# Patient Record
Sex: MALE | Race: WHITE | Employment: OTHER | URBAN - METROPOLITAN AREA
[De-identification: names, ages, dates, MRNs, and addresses within clinical notes are randomized per-mention and may not be internally consistent; named-entity substitution may affect disease eponyms.]

---

## 2017-01-01 ENCOUNTER — TELEPHONE (OUTPATIENT)
Dept: FAMILY MEDICINE CLINIC | Facility: CLINIC | Age: 82
End: 2017-01-01

## 2017-01-01 ENCOUNTER — OFFICE VISIT (OUTPATIENT)
Dept: PODIATRY CLINIC | Facility: CLINIC | Age: 82
End: 2017-01-01

## 2017-01-01 ENCOUNTER — TELEPHONE (OUTPATIENT)
Dept: OTHER | Age: 82
End: 2017-01-01

## 2017-01-01 ENCOUNTER — TELEPHONE (OUTPATIENT)
Dept: NEUROLOGY | Facility: CLINIC | Age: 82
End: 2017-01-01

## 2017-01-01 ENCOUNTER — OFFICE VISIT (OUTPATIENT)
Dept: FAMILY MEDICINE CLINIC | Facility: CLINIC | Age: 82
End: 2017-01-01

## 2017-01-01 ENCOUNTER — OFFICE VISIT (OUTPATIENT)
Dept: PULMONOLOGY | Facility: CLINIC | Age: 82
End: 2017-01-01

## 2017-01-01 VITALS
HEIGHT: 73 IN | HEART RATE: 62 BPM | TEMPERATURE: 98 F | WEIGHT: 256 LBS | DIASTOLIC BLOOD PRESSURE: 81 MMHG | RESPIRATION RATE: 16 BRPM | SYSTOLIC BLOOD PRESSURE: 146 MMHG | BODY MASS INDEX: 33.93 KG/M2

## 2017-01-01 VITALS
WEIGHT: 260 LBS | RESPIRATION RATE: 16 BRPM | DIASTOLIC BLOOD PRESSURE: 70 MMHG | OXYGEN SATURATION: 96 % | HEART RATE: 69 BPM | BODY MASS INDEX: 34.46 KG/M2 | SYSTOLIC BLOOD PRESSURE: 135 MMHG | HEIGHT: 73 IN

## 2017-01-01 DIAGNOSIS — M79.675 PAIN OF TOE OF LEFT FOOT: Primary | ICD-10-CM

## 2017-01-01 DIAGNOSIS — M79.673 PAIN OF FOOT, UNSPECIFIED LATERALITY: Primary | ICD-10-CM

## 2017-01-01 DIAGNOSIS — M79.674 PAIN OF TOE OF RIGHT FOOT: ICD-10-CM

## 2017-01-01 DIAGNOSIS — R41.3 MEMORY LOSS: ICD-10-CM

## 2017-01-01 DIAGNOSIS — B35.1 ONYCHOMYCOSIS: Primary | ICD-10-CM

## 2017-01-01 DIAGNOSIS — R41.3 MEMORY DIFFICULTIES: Primary | ICD-10-CM

## 2017-01-01 DIAGNOSIS — I26.99 OTHER PULMONARY EMBOLISM WITHOUT ACUTE COR PULMONALE, UNSPECIFIED CHRONICITY (HCC): Primary | ICD-10-CM

## 2017-01-01 DIAGNOSIS — B35.1 DERMATOPHYTOSIS OF NAIL: ICD-10-CM

## 2017-01-01 DIAGNOSIS — L30.9 DERMATITIS: ICD-10-CM

## 2017-01-01 DIAGNOSIS — M79.673 PAIN OF FOOT, UNSPECIFIED LATERALITY: ICD-10-CM

## 2017-01-01 PROCEDURE — 99212 OFFICE O/P EST SF 10 MIN: CPT | Performed by: INTERNAL MEDICINE

## 2017-01-01 PROCEDURE — 11721 DEBRIDE NAIL 6 OR MORE: CPT | Performed by: PODIATRIST

## 2017-01-01 PROCEDURE — 99213 OFFICE O/P EST LOW 20 MIN: CPT | Performed by: INTERNAL MEDICINE

## 2017-01-01 PROCEDURE — 99213 OFFICE O/P EST LOW 20 MIN: CPT | Performed by: FAMILY MEDICINE

## 2017-01-01 PROCEDURE — 99212 OFFICE O/P EST SF 10 MIN: CPT | Performed by: FAMILY MEDICINE

## 2017-01-01 RX ORDER — QUETIAPINE 25 MG/1
TABLET, FILM COATED ORAL
Qty: 270 TABLET | Refills: 0 | Status: SHIPPED | OUTPATIENT
Start: 2017-01-01

## 2017-01-01 RX ORDER — LISINOPRIL AND HYDROCHLOROTHIAZIDE 12.5; 1 MG/1; MG/1
TABLET ORAL
Qty: 90 TABLET | Refills: 1 | Status: SHIPPED | OUTPATIENT
Start: 2017-01-01 | End: 2018-01-01

## 2017-01-01 RX ORDER — LISINOPRIL AND HYDROCHLOROTHIAZIDE 12.5; 1 MG/1; MG/1
TABLET ORAL
Qty: 90 TABLET | Refills: 0 | Status: SHIPPED | OUTPATIENT
Start: 2017-01-01 | End: 2017-01-01

## 2017-01-01 RX ORDER — GLIMEPIRIDE 1 MG/1
TABLET ORAL
Qty: 90 TABLET | Refills: 3 | Status: SHIPPED | OUTPATIENT
Start: 2017-01-01 | End: 2018-01-01

## 2017-01-01 RX ORDER — OMEPRAZOLE 20 MG/1
20 CAPSULE, DELAYED RELEASE ORAL EVERY MORNING
Qty: 180 CAPSULE | Refills: 3 | Status: SHIPPED | OUTPATIENT
Start: 2017-01-01

## 2017-01-01 RX ORDER — MOMETASONE FUROATE 1 MG/G
OINTMENT TOPICAL
Qty: 45 G | Refills: 2 | Status: SHIPPED | OUTPATIENT
Start: 2017-01-01

## 2017-01-01 RX ORDER — QUETIAPINE 25 MG/1
25 TABLET, FILM COATED ORAL NIGHTLY
Qty: 90 TABLET | Refills: 0 | Status: SHIPPED | OUTPATIENT
Start: 2017-01-01 | End: 2017-01-01

## 2017-02-03 ENCOUNTER — OFFICE VISIT (OUTPATIENT)
Dept: OTOLARYNGOLOGY | Facility: CLINIC | Age: 82
End: 2017-02-03

## 2017-02-03 ENCOUNTER — OFFICE VISIT (OUTPATIENT)
Dept: AUDIOLOGY | Facility: CLINIC | Age: 82
End: 2017-02-03

## 2017-02-03 VITALS — SYSTOLIC BLOOD PRESSURE: 135 MMHG | DIASTOLIC BLOOD PRESSURE: 68 MMHG | HEART RATE: 70 BPM

## 2017-02-03 DIAGNOSIS — H90.3 SENSORINEURAL HEARING LOSS, BILATERAL: Primary | ICD-10-CM

## 2017-02-03 DIAGNOSIS — H91.90 HEARING LOSS, UNSPECIFIED HEARING LOSS TYPE, UNSPECIFIED LATERALITY: Primary | ICD-10-CM

## 2017-02-03 PROCEDURE — 92567 TYMPANOMETRY: CPT | Performed by: AUDIOLOGIST

## 2017-02-03 PROCEDURE — G0463 HOSPITAL OUTPT CLINIC VISIT: HCPCS | Performed by: OTOLARYNGOLOGY

## 2017-02-03 PROCEDURE — 92557 COMPREHENSIVE HEARING TEST: CPT | Performed by: AUDIOLOGIST

## 2017-02-03 PROCEDURE — 99203 OFFICE O/P NEW LOW 30 MIN: CPT | Performed by: OTOLARYNGOLOGY

## 2017-02-03 NOTE — PROGRESS NOTES
Mell Huffman is a 80year old male. Patient presents with:  Hearing Loss        HISTORY OF PRESENT ILLNESS  2/3/2017   Here for evaluation of   hearing loss.  Patient feels this has worsened over the las years and  is not  associated with tinnitus ot intolerance. Neuro Negative Tremors. Psych Negative Behavioral issues   Integumentary Negative Frequent skin infections, pigment change and rash. Hema/Lymph Negative Easy bleeding and easy bruising.      PHYSICAL EXAM    /68 mmHg  Pulse 70    Sy Cyanocobalamin (B-12) 2000 MCG Oral Tab, Take 1 tablet by mouth daily. , Disp: , Rfl:   •  aspirin 325 MG Oral Tab, Take 325 mg by mouth daily. , Disp: , Rfl:   •  acetaminophen (TYLENOL EXTRA STRENGTH) 500 MG Oral Tab, Take 500 mg by mouth every 6 (six) ho

## 2017-02-03 NOTE — PROGRESS NOTES
AUDIOLOGY REPORT      Hyacinth Ross is a 80year old male     Referring Provider: No ref. provider found   YOB: 1930  Medical Record: [id number]      Patient Hearing History:  Patient is here with wife.    He reported some balance/unste amplification with hearing aids would be preferable to address more situations than just TV-watching. Recommendations: Follow up with Dr. Latisha Velarde. Consider hearing aid evaluation to further explore amplification options, styles and costs.        [employer]

## 2017-02-24 NOTE — PROGRESS NOTES
HPI:    Patient ID: Madelyn Doyle is a 80year old male. HPI  This pleasant 51-year-old male presents for care associated with his chronic painful fungus toenails. He reports relief by previous care.   Review of Systems  I did review present medic some keratosis. Upon debridement there is no ulceration or infection.   I anticipate relief will see patient for care if and when symptoms redevelop         ASSESSMENT/PLAN:   Pain of toe of left foot  (primary encounter diagnosis)  Pain of toe of right fo

## 2017-04-13 NOTE — PROGRESS NOTES
HPI:    Patient ID: Sukhjinder De La Rosa is a 80year old male. HPI Comments: Pt presents with an itchy rash on the trunk for the last week. No new topical agents or ingestions. Pt has tried otc remedies without consistent relief.  No fevers or acute illn STRENGTH) 500 MG Oral Tab Take 500 mg by mouth every 6 (six) hours as needed for Pain. Disp:  Rfl:      Allergies:No Known Allergies   PHYSICAL EXAM:   Physical Exam   Constitutional: He appears well-developed and well-nourished.    Cardiovascular: Normal r

## 2017-04-28 NOTE — PROGRESS NOTES
HPI:    Patient ID: Matthew Cannon is a 80year old male. HPI  This 72-year-old male presents with recurrent pain associated with his fungus toenails. Patient reports relief by previous care.   Review of Systems  I did review present medical status accomplished today without incident. I reduced nail, subungual debris, and some keratosis. Upon debridement there is no ulceration or infection open or draining was noted.   I anticipate relief and will reevaluate for care if and when symptoms redevelop

## 2017-05-06 NOTE — TELEPHONE ENCOUNTER
Message noted: Chart reviewed and may refill medication as requested times one with 3additional refills. Prescription sent to listed pharmacy. Pharmacy to notify patient.

## 2017-05-07 NOTE — TELEPHONE ENCOUNTER
Neuropsychology testing from Dr. Wade Fatima 2/22/17 reviewed: In summary, primary production, moderate in magnitude, and aspects of executive functioning and anterograde memory functions (at levels of retrieval/recognition).   Additionally, divided and se

## 2017-06-26 NOTE — PROGRESS NOTES
The patient is an 40-year-old male who I know well from prior evaluation of comes in now for follow-up. In general, he is doing well. He remains on aspirin for history of venous thromboembolism.     Review of systems: Vision normal. Ear nose and throat no

## 2017-06-30 NOTE — TELEPHONE ENCOUNTER
Pt has new h[employer] insurance needs 2 referrals one for neurology dr Mena samuel and another one for dr sanchez podiatry

## 2017-07-01 NOTE — TELEPHONE ENCOUNTER
Referral requests noted. Referral approved, generated and sent to [facility].

## 2017-08-21 NOTE — TELEPHONE ENCOUNTER
Please make sure that patient follows up with PCP or Caty Portillo PA-C regarding vitamin D, omeprazole, and magnesium levels

## 2017-08-21 NOTE — TELEPHONE ENCOUNTER
Pending Prescriptions Disp Refills    OMEPRAZOLE 20 MG Oral Capsule Delayed Release [Pharmacy Med Name: OMEPRAZOLE 20MG CAPSULES] 180 capsule 0     Sig: TAKE 1 CAPSULE(20 MG) BY MOUTH TWICE DAILY BEFORE MEALS         See refill request  Patient last seen

## 2017-08-22 NOTE — TELEPHONE ENCOUNTER
omeprazole 20 MG Oral Capsule Delayed Release was prescribe by Dr. Maylin Joaquin. Wife asking if pt can take omeprazole with vitamin D ? Please advise.

## 2017-08-22 NOTE — TELEPHONE ENCOUNTER
I spoke to Shashank's wife. She states the pt was unable to come to the phone. I advised pt make an appt with either Milton Elena PA-C or his PCP.  Pt's wife verbalizes understanding but states it is difficult to get him out of the house

## 2017-08-23 NOTE — TELEPHONE ENCOUNTER
[facility] they received an order but needs pt's demographics with insurance info and LOV progress note.        Fax #  [phone]

## 2017-08-25 NOTE — TELEPHONE ENCOUNTER
Spoke to patient's wife (HIPPA verified) and relayed NP message in regards to okay to take Omeprazole and Vitamin D, just to take at different times. Verbalized understanding. No further questions or concerns at this time.

## 2017-08-31 NOTE — TELEPHONE ENCOUNTER
[facility] requesting nurse for Dr Trae Castillo to call her back re  home health care for pt- said   pt does not meet criteria for home health    already spoke with Referral Dept & advised to speak with RN

## 2017-09-08 NOTE — TELEPHONE ENCOUNTER
Daughter calling very upset as they have been trying to get [address] initiated for her father for several weeks now.  I spoke with Breanna Starr nurse  who states care must be initiated by Dr Ronaldo Gautam with a phone call to [facility]

## 2017-09-09 NOTE — TELEPHONE ENCOUNTER
I have not seen patient in some time. Was patient hospitalized recently? Or did another physician initiate home health. Usually for home health there needs to be a recent face to face encounter to establish nursing or PT needs.  Has this been done with jamel

## 2017-09-11 NOTE — TELEPHONE ENCOUNTER
[facility] supervisor states that they would need a recent OV (within the last month) to be able to start the [facility] process. [facility] care for medicare has to have a specific \"goal\" for the pt and that would be specified by the PCP.  She also suggested transitioning

## 2017-09-11 NOTE — TELEPHONE ENCOUNTER
Spoke at length with pt's daughter Sana Whipple. [phone].   States that her father hasn't been recently hospitalized nor has he seen another MD.  Pt is getting more difficult to take care of and extremely difficult to get out of the house (to bring to the d

## 2017-09-11 NOTE — TELEPHONE ENCOUNTER
Message noted and then would have the following options:    1. to come in to discuss either with us at an office visit. 2. Can use a physician that makes home visits to make an an official visit for assessment.    3. If patient is having sig medical issues

## 2017-09-11 NOTE — TELEPHONE ENCOUNTER
Advised patient's daughter Kike Kaiser of Dr. Catarina Patten note below. Patient's daughter verbalized understanding.  Patient's daughter is waiting to hear back from [facility] who are reaching out to Neurologist to see if visit on 9/25 could qualify for the face to fa

## 2017-09-18 NOTE — TELEPHONE ENCOUNTER
Hypertensive Medications Medication refilled for 90 days per protocol.   Protocol Criteria:  · Appointment scheduled in the past 6 months or in the next 3 months  · BMP or CMP in the past 12 months  · Creatinine result < 2  Recent Outpatient Visits

## 2017-09-25 NOTE — PROGRESS NOTES
[facility] Follow[facility]    Yuko Gupta is a 80year old male. HPI:     Patient is being seen in follow-up. He is accompanied by multiple family members, including daughter, son, and wife, to the visit today.   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 Not consistent with atypical form of dementia such as Lewy body or frontotemporal dementia.   No appare Rfl:    acetaminophen (TYLENOL EXTRA STRENGTH) 500 MG Oral Tab Take 500 mg by mouth every 6 (six) hours as needed for Pain.  Disp:  Rfl:        Allergies:  No Known Allergies      ROS:   GENERAL: no weight loss or fevers  HEENT: denies nasal congestion, sin us an update, and we may have to continue to adjust to optimal dose; discussed risks/side effects including sedation    –Pending response to Seroquel, may consider adding on Namenda to Aricept    –Patient would benefit from home health services including P

## 2017-09-25 NOTE — TELEPHONE ENCOUNTER
Pts daughter calling to notify us that she would like paperwork filled out by Dr. Edward Saenz to the residential home health service for her father to have in home care because it is hard to get him out of the house.  Wanted us to know prior to appointment kathleen

## 2017-09-26 NOTE — TELEPHONE ENCOUNTER
Received call from Eben Black ( ) from [facility]. Patient needs an order to be sent to [facility].  It needs to say:  RN,P.T.,O.T.,MSW,and AID.   Please fax order to [facility]. Thank Kirk Calix

## 2017-09-27 NOTE — PROGRESS NOTES
Message and consult noted. I am not aware of anyone from our clinic or at [facility] that does home visits. Thanks for the update.

## 2017-09-27 NOTE — TELEPHONE ENCOUNTER
Forms completed and signed by Dr. Charbel Hernandez. Will wait for office note from 9/25/17 to be completed before faxing to [facility].

## 2017-09-27 NOTE — TELEPHONE ENCOUNTER
Forms received 9-22-17 in nurses bin; [facility] needs back asap.  Call with any questions

## 2017-10-20 NOTE — TELEPHONE ENCOUNTER
Spoke with [facility] with progress update:  Please see below and advise    Yuko De La Cruz is requesting verbal order for OT 1 x week x 3 weeks    Home Health Aide 2 x week x 3 weeks to try and facilitate daily hygiene routine as wife is primary caregive

## 2017-10-20 NOTE — TELEPHONE ENCOUNTER
Kaykay/[facility] physical therapist requesting callback to provide OT status  Also questioning if Medication adjustment might be required

## 2017-10-21 NOTE — TELEPHONE ENCOUNTER
Message noted and approve home health requests. Will need to follow up with his neurologist, Dr Charbel Hernandez for medication adjustment.

## 2017-10-23 NOTE — TELEPHONE ENCOUNTER
Kaykay/[facility] requesting verbal order  for speech therapy for cognitive therapy for 2 times for 2 weeks

## 2017-10-23 NOTE — TELEPHONE ENCOUNTER
S/w Elise to notify her Dr. Sean Marshall suggests to increase Seroquel to 25 mg twice daily. Lynette Crum stated she would call back when pt needs refills.

## 2017-10-23 NOTE — TELEPHONE ENCOUNTER
Susan Hoffman (pt's wife) says that she sees no difference in patient's behavior since starting Seroquel 25mg every night. Wife said therapist came to the home Friday, and patient was uncooperative. Wife is asking if a change in medication might help.      Theresa Ly

## 2017-10-23 NOTE — TELEPHONE ENCOUNTER
Referral request noted. Referral approved, generated and sent to [facility].

## 2017-10-23 NOTE — TELEPHONE ENCOUNTER
Left message to Yoan Mora RN [facility] about Dr Hayley Minaya note. Note      That is fine for speech therapy.

## 2017-10-23 NOTE — TELEPHONE ENCOUNTER
Please see message below and advise.       Note      Kaykay/[facility] requesting verbal order  for speech therapy for cognitive therapy for 2 times for 2 weeks

## 2017-10-23 NOTE — TELEPHONE ENCOUNTER
Spouse is calling for a podiatry referral for her . Please sign referral order if you agree. Thank you.

## 2017-11-01 NOTE — TELEPHONE ENCOUNTER
Wife is asking for a referral for a Podiatrist to see the patient from [address] Physician [facility] to come to his house and cut his nails. I pended the referral with Onychomycosis as the diagnostic code. Please advise.

## 2017-11-02 NOTE — TELEPHONE ENCOUNTER
Called [employer]. Patient has [employer] benefits. Can go out of network. No referral need. But patient needs to call health plan to verify providers. [phone]. Left message on VM to call me back.

## 2017-11-08 NOTE — TELEPHONE ENCOUNTER
Spoke to patient's home health nurse, Kendra Jang. Patient will be discharged soon from home health and wanted to touch base with office. His seroquel was increased to 25 mg BID which has not helped with his behavior and agitation.  Wife admits that she does not f

## 2017-11-09 NOTE — TELEPHONE ENCOUNTER
Can increase Seroquel to 25 mg in the morning and 50 mg at bedtime; if tolerating, can further increase to 50 mg twice daily after 1 week. Keep us updated on patient's progress.

## 2017-11-10 NOTE — TELEPHONE ENCOUNTER
S/w Essie Bermudez she stated she is unable to take medication directions and asked to call wife. S/w Elise to notify her pt can increase Seroquel to 25 mg in the am and 50 mg nightly x 1 week then increase to 50 mg BID if tolerating.  Tricia He agrees to plan and was th

## 2017-11-13 NOTE — TELEPHONE ENCOUNTER
Refill request for Quetiapine 25 mg take 1 tab nightly, qt:90 0 refills    LOV: 9/25/17  NOV: None  Last refill: 9/25/17 qt:30 3 refills

## 2017-11-17 NOTE — TELEPHONE ENCOUNTER
Aris Kurtz from [facility] called to inform Dr. Rodriguez Knock discharge , and is asking for physician verbal order for palliative care consult. Please advise.

## 2017-11-20 NOTE — TELEPHONE ENCOUNTER
Dione Angelucci , message left on  Voice mail that Dr. Shalom Bryant approve verbal order for Palliative Care .

## 2017-11-27 NOTE — TELEPHONE ENCOUNTER
Vadim Juarez called in requesting discharge agency orders for Pt. Vadim Juarez states that if  Has any questions to please call.

## 2017-12-01 NOTE — TELEPHONE ENCOUNTER
Received call from [facility], counselor for [facility] who reports patient has end stage Alzheimer, dementia, and Type 2 diabetes and wife is requesting an order to place patient on hospice. Order needs to be faxed to [phone].  Message routed to

## 2017-12-02 NOTE — TELEPHONE ENCOUNTER
Message noted and can initiate hospice care. Can give verbal order and will sign home health/ hospice order.

## 2017-12-16 NOTE — TELEPHONE ENCOUNTER
Hypertensive Medications: Protocol failed, please advise on prescription request.'  Protocol Criteria:  · Appointment scheduled in the past 6 months or in the next 3 months  · BMP or CMP in the past 12 months  · Creatinine result < 2  Recent Outpatient Vis

## 2017-12-16 NOTE — TELEPHONE ENCOUNTER
Dr. Buck Espino, Med did not pass protocol, please advise on refill    CSS, please assist pt/family member to schedule appt.

## 2018-01-01 ENCOUNTER — TELEPHONE (OUTPATIENT)
Dept: INTERNAL MEDICINE CLINIC | Facility: CLINIC | Age: 83
End: 2018-01-01

## 2018-01-01 ENCOUNTER — TELEPHONE (OUTPATIENT)
Dept: OTHER | Age: 83
End: 2018-01-01

## 2018-01-01 ENCOUNTER — APPOINTMENT (OUTPATIENT)
Dept: CT IMAGING | Facility: HOSPITAL | Age: 83
DRG: 082 | End: 2018-01-01
Attending: INTERNAL MEDICINE
Payer: MEDICARE

## 2018-01-01 ENCOUNTER — APPOINTMENT (OUTPATIENT)
Dept: CT IMAGING | Facility: HOSPITAL | Age: 83
DRG: 082 | End: 2018-01-01
Attending: EMERGENCY MEDICINE
Payer: MEDICARE

## 2018-01-01 ENCOUNTER — APPOINTMENT (OUTPATIENT)
Dept: GENERAL RADIOLOGY | Facility: HOSPITAL | Age: 83
DRG: 082 | End: 2018-01-01
Attending: INTERNAL MEDICINE
Payer: MEDICARE

## 2018-01-01 ENCOUNTER — HOSPITAL ENCOUNTER (INPATIENT)
Facility: HOSPITAL | Age: 83
LOS: 7 days | Discharge: NURSING, REHAB OR LONG-TERM CARE | DRG: 082 | End: 2018-01-01
Attending: EMERGENCY MEDICINE | Admitting: HOSPITALIST
Payer: MEDICARE

## 2018-01-01 VITALS
SYSTOLIC BLOOD PRESSURE: 142 MMHG | OXYGEN SATURATION: 95 % | DIASTOLIC BLOOD PRESSURE: 46 MMHG | BODY MASS INDEX: 37 KG/M2 | WEIGHT: 280.5 LBS | RESPIRATION RATE: 16 BRPM | TEMPERATURE: 98 F | HEART RATE: 94 BPM

## 2018-01-01 DIAGNOSIS — S16.1XXA STRAIN OF NECK MUSCLE, INITIAL ENCOUNTER: ICD-10-CM

## 2018-01-01 DIAGNOSIS — S06.5X9A ACUTE SUBDURAL HEMATOMA (HCC): Primary | ICD-10-CM

## 2018-01-01 LAB
ADENOVIRUS PCR:: NEGATIVE
ANION GAP SERPL CALC-SCNC: 12 MMOL/L (ref 0–18)
ANION GAP SERPL CALC-SCNC: 8 MMOL/L (ref 0–18)
ANION GAP SERPL CALC-SCNC: 8 MMOL/L (ref 0–18)
ANTIBODY SCREEN: NEGATIVE
APTT PPP: 28.7 SECONDS (ref 23.2–35.3)
B PERT DNA SPEC QL NAA+PROBE: NEGATIVE
BASOPHILS # BLD: 0 K/UL (ref 0–0.2)
BASOPHILS # BLD: 0.1 K/UL (ref 0–0.2)
BASOPHILS NFR BLD: 0 %
BASOPHILS NFR BLD: 1 %
BILIRUB UR QL: NEGATIVE
BLOOD TYPE BARCODE: 600
BLOOD TYPE BARCODE: 600
BUN SERPL-MCNC: 17 MG/DL (ref 8–20)
BUN SERPL-MCNC: 17 MG/DL (ref 8–20)
BUN SERPL-MCNC: 18 MG/DL (ref 8–20)
BUN/CREAT SERPL: 10.5 (ref 10–20)
BUN/CREAT SERPL: 13.6 (ref 10–20)
BUN/CREAT SERPL: 13.9 (ref 10–20)
C PNEUM DNA SPEC QL NAA+PROBE: NEGATIVE
CALCIUM SERPL-MCNC: 8.9 MG/DL (ref 8.5–10.5)
CALCIUM SERPL-MCNC: 9 MG/DL (ref 8.5–10.5)
CALCIUM SERPL-MCNC: 9.2 MG/DL (ref 8.5–10.5)
CHLORIDE SERPL-SCNC: 102 MMOL/L (ref 95–110)
CHLORIDE SERPL-SCNC: 105 MMOL/L (ref 95–110)
CHLORIDE SERPL-SCNC: 107 MMOL/L (ref 95–110)
CO2 SERPL-SCNC: 22 MMOL/L (ref 22–32)
CO2 SERPL-SCNC: 22 MMOL/L (ref 22–32)
CO2 SERPL-SCNC: 24 MMOL/L (ref 22–32)
COLOR UR: YELLOW
CORONAVIRUS 229E PCR:: NEGATIVE
CORONAVIRUS HKU1 PCR:: NEGATIVE
CORONAVIRUS NL63 PCR:: NEGATIVE
CORONAVIRUS OC43 PCR:: NEGATIVE
CREAT SERPL-MCNC: 1.22 MG/DL (ref 0.5–1.5)
CREAT SERPL-MCNC: 1.25 MG/DL (ref 0.5–1.5)
CREAT SERPL-MCNC: 1.71 MG/DL (ref 0.5–1.5)
EOSINOPHIL # BLD: 0 K/UL (ref 0–0.7)
EOSINOPHIL # BLD: 0.1 K/UL (ref 0–0.7)
EOSINOPHIL # BLD: 0.1 K/UL (ref 0–0.7)
EOSINOPHIL NFR BLD: 0 %
EOSINOPHIL NFR BLD: 1 %
EOSINOPHIL NFR BLD: 1 %
ERYTHROCYTE [DISTWIDTH] IN BLOOD BY AUTOMATED COUNT: 14.8 % (ref 11–15)
ERYTHROCYTE [DISTWIDTH] IN BLOOD BY AUTOMATED COUNT: 14.9 % (ref 11–15)
ERYTHROCYTE [DISTWIDTH] IN BLOOD BY AUTOMATED COUNT: 15.1 % (ref 11–15)
ERYTHROCYTE [DISTWIDTH] IN BLOOD BY AUTOMATED COUNT: 15.8 % (ref 11–15)
ERYTHROCYTE [DISTWIDTH] IN BLOOD BY AUTOMATED COUNT: 15.8 % (ref 11–15)
FLUAV H3 RNA SPEC QL NAA+PROBE: POSITIVE
FLUBV RNA SPEC QL NAA+PROBE: NEGATIVE
GLUCOSE BLDC GLUCOMTR-MCNC: 106 MG/DL (ref 70–99)
GLUCOSE BLDC GLUCOMTR-MCNC: 113 MG/DL (ref 70–99)
GLUCOSE BLDC GLUCOMTR-MCNC: 118 MG/DL (ref 70–99)
GLUCOSE BLDC GLUCOMTR-MCNC: 124 MG/DL (ref 70–99)
GLUCOSE BLDC GLUCOMTR-MCNC: 127 MG/DL (ref 70–99)
GLUCOSE BLDC GLUCOMTR-MCNC: 128 MG/DL (ref 70–99)
GLUCOSE BLDC GLUCOMTR-MCNC: 130 MG/DL (ref 70–99)
GLUCOSE BLDC GLUCOMTR-MCNC: 134 MG/DL (ref 70–99)
GLUCOSE BLDC GLUCOMTR-MCNC: 136 MG/DL (ref 70–99)
GLUCOSE BLDC GLUCOMTR-MCNC: 145 MG/DL (ref 70–99)
GLUCOSE BLDC GLUCOMTR-MCNC: 145 MG/DL (ref 70–99)
GLUCOSE BLDC GLUCOMTR-MCNC: 151 MG/DL (ref 70–99)
GLUCOSE BLDC GLUCOMTR-MCNC: 156 MG/DL (ref 70–99)
GLUCOSE BLDC GLUCOMTR-MCNC: 165 MG/DL (ref 70–99)
GLUCOSE BLDC GLUCOMTR-MCNC: 170 MG/DL (ref 70–99)
GLUCOSE BLDC GLUCOMTR-MCNC: 171 MG/DL (ref 70–99)
GLUCOSE BLDC GLUCOMTR-MCNC: 176 MG/DL (ref 70–99)
GLUCOSE BLDC GLUCOMTR-MCNC: 176 MG/DL (ref 70–99)
GLUCOSE BLDC GLUCOMTR-MCNC: 177 MG/DL (ref 70–99)
GLUCOSE BLDC GLUCOMTR-MCNC: 182 MG/DL (ref 70–99)
GLUCOSE BLDC GLUCOMTR-MCNC: 182 MG/DL (ref 70–99)
GLUCOSE BLDC GLUCOMTR-MCNC: 199 MG/DL (ref 70–99)
GLUCOSE BLDC GLUCOMTR-MCNC: 201 MG/DL (ref 70–99)
GLUCOSE BLDC GLUCOMTR-MCNC: 209 MG/DL (ref 70–99)
GLUCOSE BLDC GLUCOMTR-MCNC: 91 MG/DL (ref 70–99)
GLUCOSE BLDC GLUCOMTR-MCNC: 96 MG/DL (ref 70–99)
GLUCOSE SERPL-MCNC: 122 MG/DL (ref 70–99)
GLUCOSE SERPL-MCNC: 137 MG/DL (ref 70–99)
GLUCOSE SERPL-MCNC: 162 MG/DL (ref 70–99)
GLUCOSE UR-MCNC: NEGATIVE MG/DL
HBA1C MFR BLD: 7.8 % (ref 4–6)
HCT VFR BLD AUTO: 33 % (ref 41–52)
HCT VFR BLD AUTO: 33.9 % (ref 41–52)
HCT VFR BLD AUTO: 34.4 % (ref 41–52)
HCT VFR BLD AUTO: 35.5 % (ref 41–52)
HCT VFR BLD AUTO: 39.9 % (ref 41–52)
HGB BLD-MCNC: 10.8 G/DL (ref 13.5–17.5)
HGB BLD-MCNC: 11.1 G/DL (ref 13.5–17.5)
HGB BLD-MCNC: 11.4 G/DL (ref 13.5–17.5)
HGB BLD-MCNC: 11.7 G/DL (ref 13.5–17.5)
HGB BLD-MCNC: 13.1 G/DL (ref 13.5–17.5)
INR BLD: 1.1 (ref 0.9–1.2)
INR BLD: 1.1 (ref 0.9–1.2)
KETONES UR-MCNC: NEGATIVE MG/DL
LACTATE SERPL-SCNC: 1.5 MMOL/L (ref 0.5–2.2)
LYMPHOCYTES # BLD: 0.9 K/UL (ref 1–4)
LYMPHOCYTES # BLD: 1.1 K/UL (ref 1–4)
LYMPHOCYTES # BLD: 1.4 K/UL (ref 1–4)
LYMPHOCYTES # BLD: 1.7 K/UL (ref 1–4)
LYMPHOCYTES # BLD: 1.7 K/UL (ref 1–4)
LYMPHOCYTES NFR BLD: 10 %
LYMPHOCYTES NFR BLD: 14 %
LYMPHOCYTES NFR BLD: 16 %
LYMPHOCYTES NFR BLD: 21 %
LYMPHOCYTES NFR BLD: 25 %
MAGNESIUM SERPL-MCNC: 1.9 MG/DL (ref 1.8–2.5)
MAGNESIUM SERPL-MCNC: 1.9 MG/DL (ref 1.8–2.5)
MAGNESIUM SERPL-MCNC: 2 MG/DL (ref 1.8–2.5)
MCH RBC QN AUTO: 28.8 PG (ref 27–32)
MCH RBC QN AUTO: 28.9 PG (ref 27–32)
MCH RBC QN AUTO: 28.9 PG (ref 27–32)
MCH RBC QN AUTO: 29.1 PG (ref 27–32)
MCH RBC QN AUTO: 29.4 PG (ref 27–32)
MCHC RBC AUTO-ENTMCNC: 32.6 G/DL (ref 32–37)
MCHC RBC AUTO-ENTMCNC: 32.8 G/DL (ref 32–37)
MCHC RBC AUTO-ENTMCNC: 33.2 G/DL (ref 32–37)
MCV RBC AUTO: 87.7 FL (ref 80–100)
MCV RBC AUTO: 88.2 FL (ref 80–100)
MCV RBC AUTO: 88.2 FL (ref 80–100)
MCV RBC AUTO: 88.3 FL (ref 80–100)
MCV RBC AUTO: 89.7 FL (ref 80–100)
METAPNEUMOVIRUS PCR:: NEGATIVE
MONOCYTES # BLD: 0.6 K/UL (ref 0–1)
MONOCYTES # BLD: 0.7 K/UL (ref 0–1)
MONOCYTES # BLD: 0.7 K/UL (ref 0–1)
MONOCYTES # BLD: 1 K/UL (ref 0–1)
MONOCYTES # BLD: 1 K/UL (ref 0–1)
MONOCYTES NFR BLD: 10 %
MONOCYTES NFR BLD: 12 %
MONOCYTES NFR BLD: 9 %
MRSA DNA SPEC QL NAA+PROBE: NEGATIVE
MYCOPLASMA PNEUMONIA PCR:: NEGATIVE
NEUTROPHILS # BLD AUTO: 4.5 K/UL (ref 1.8–7.7)
NEUTROPHILS # BLD AUTO: 4.6 K/UL (ref 1.8–7.7)
NEUTROPHILS # BLD AUTO: 6 K/UL (ref 1.8–7.7)
NEUTROPHILS # BLD AUTO: 6.5 K/UL (ref 1.8–7.7)
NEUTROPHILS # BLD AUTO: 8 K/UL (ref 1.8–7.7)
NEUTROPHILS NFR BLD: 65 %
NEUTROPHILS NFR BLD: 69 %
NEUTROPHILS NFR BLD: 74 %
NEUTROPHILS NFR BLD: 76 %
NEUTROPHILS NFR BLD: 77 %
NITRITE UR QL STRIP.AUTO: NEGATIVE
OSMOLALITY UR CALC.SUM OF ELEC: 286 MOSM/KG (ref 275–295)
OSMOLALITY UR CALC.SUM OF ELEC: 287 MOSM/KG (ref 275–295)
OSMOLALITY UR CALC.SUM OF ELEC: 289 MOSM/KG (ref 275–295)
PARAINFLUENZA 1 PCR:: NEGATIVE
PARAINFLUENZA 2 PCR:: NEGATIVE
PARAINFLUENZA 3 PCR:: NEGATIVE
PARAINFLUENZA 4 PCR:: NEGATIVE
PH UR: 5 [PH] (ref 5–8)
PLATELET # BLD AUTO: 200 K/UL (ref 140–400)
PLATELET # BLD AUTO: 205 K/UL (ref 140–400)
PLATELET # BLD AUTO: 223 K/UL (ref 140–400)
PLATELET # BLD AUTO: 232 K/UL (ref 140–400)
PLATELET # BLD AUTO: 246 K/UL (ref 140–400)
PMV BLD AUTO: 8.1 FL (ref 7.4–10.3)
PMV BLD AUTO: 8.2 FL (ref 7.4–10.3)
PMV BLD AUTO: 8.5 FL (ref 7.4–10.3)
PMV BLD AUTO: 8.6 FL (ref 7.4–10.3)
PMV BLD AUTO: 8.8 FL (ref 7.4–10.3)
POTASSIUM SERPL-SCNC: 3.6 MMOL/L (ref 3.3–5.1)
POTASSIUM SERPL-SCNC: 4 MMOL/L (ref 3.3–5.1)
POTASSIUM SERPL-SCNC: 4.7 MMOL/L (ref 3.3–5.1)
PROT UR-MCNC: 30 MG/DL
PROTHROMBIN TIME: 13.3 SECONDS (ref 11.8–14.5)
PROTHROMBIN TIME: 13.6 SECONDS (ref 11.8–14.5)
RBC # BLD AUTO: 3.75 M/UL (ref 4.5–5.9)
RBC # BLD AUTO: 3.85 M/UL (ref 4.5–5.9)
RBC # BLD AUTO: 3.92 M/UL (ref 4.5–5.9)
RBC # BLD AUTO: 3.96 M/UL (ref 4.5–5.9)
RBC # BLD AUTO: 4.52 M/UL (ref 4.5–5.9)
RBC #/AREA URNS AUTO: 5 /HPF
RH BLOOD TYPE: POSITIVE
RHINOVIRUS/ENTERO PCR:: NEGATIVE
RSV RNA SPEC QL NAA+PROBE: NEGATIVE
SODIUM SERPL-SCNC: 136 MMOL/L (ref 136–144)
SODIUM SERPL-SCNC: 137 MMOL/L (ref 136–144)
SODIUM SERPL-SCNC: 137 MMOL/L (ref 136–144)
SP GR UR STRIP: 1.02 (ref 1–1.03)
UROBILINOGEN UR STRIP-ACNC: <2
VIT C UR-MCNC: 40 MG/DL
WBC # BLD AUTO: 10.8 K/UL (ref 4–11)
WBC # BLD AUTO: 6.6 K/UL (ref 4–11)
WBC # BLD AUTO: 6.9 K/UL (ref 4–11)
WBC # BLD AUTO: 7.9 K/UL (ref 4–11)
WBC # BLD AUTO: 8.4 K/UL (ref 4–11)
WBC #/AREA URNS AUTO: 7 /HPF

## 2018-01-01 PROCEDURE — 99232 SBSQ HOSP IP/OBS MODERATE 35: CPT | Performed by: OTHER

## 2018-01-01 PROCEDURE — 99233 SBSQ HOSP IP/OBS HIGH 50: CPT | Performed by: INTERNAL MEDICINE

## 2018-01-01 PROCEDURE — 99233 SBSQ HOSP IP/OBS HIGH 50: CPT | Performed by: HOSPITALIST

## 2018-01-01 PROCEDURE — 70450 CT HEAD/BRAIN W/O DYE: CPT | Performed by: INTERNAL MEDICINE

## 2018-01-01 PROCEDURE — 72125 CT NECK SPINE W/O DYE: CPT | Performed by: EMERGENCY MEDICINE

## 2018-01-01 PROCEDURE — 99233 SBSQ HOSP IP/OBS HIGH 50: CPT | Performed by: OTHER

## 2018-01-01 PROCEDURE — 99232 SBSQ HOSP IP/OBS MODERATE 35: CPT | Performed by: INTERNAL MEDICINE

## 2018-01-01 PROCEDURE — 99291 CRITICAL CARE FIRST HOUR: CPT | Performed by: INTERNAL MEDICINE

## 2018-01-01 PROCEDURE — 71045 X-RAY EXAM CHEST 1 VIEW: CPT | Performed by: INTERNAL MEDICINE

## 2018-01-01 PROCEDURE — 30233R1 TRANSFUSION OF NONAUTOLOGOUS PLATELETS INTO PERIPHERAL VEIN, PERCUTANEOUS APPROACH: ICD-10-PCS | Performed by: HOSPITALIST

## 2018-01-01 PROCEDURE — 90792 PSYCH DIAG EVAL W/MED SRVCS: CPT | Performed by: OTHER

## 2018-01-01 PROCEDURE — 99239 HOSP IP/OBS DSCHRG MGMT >30: CPT | Performed by: HOSPITALIST

## 2018-01-01 PROCEDURE — 70450 CT HEAD/BRAIN W/O DYE: CPT | Performed by: EMERGENCY MEDICINE

## 2018-01-01 RX ORDER — HYDRALAZINE HYDROCHLORIDE 20 MG/ML
5 INJECTION INTRAMUSCULAR; INTRAVENOUS EVERY 4 HOURS PRN
Status: DISCONTINUED | OUTPATIENT
Start: 2018-01-01 | End: 2018-01-01

## 2018-01-01 RX ORDER — DIVALPROEX SODIUM 250 MG/1
250 TABLET, EXTENDED RELEASE ORAL NIGHTLY
Status: DISCONTINUED | OUTPATIENT
Start: 2018-01-01 | End: 2018-01-01

## 2018-01-01 RX ORDER — LORAZEPAM 2 MG/ML
INJECTION INTRAMUSCULAR
Status: COMPLETED
Start: 2018-01-01 | End: 2018-01-01

## 2018-01-01 RX ORDER — ATORVASTATIN CALCIUM 20 MG/1
20 TABLET, FILM COATED ORAL NIGHTLY
Status: DISCONTINUED | OUTPATIENT
Start: 2018-01-01 | End: 2018-01-01

## 2018-01-01 RX ORDER — SODIUM CHLORIDE 9 MG/ML
125 INJECTION, SOLUTION INTRAVENOUS CONTINUOUS
Status: DISCONTINUED | OUTPATIENT
Start: 2018-01-01 | End: 2018-01-01

## 2018-01-01 RX ORDER — QUETIAPINE 25 MG/1
25 TABLET, FILM COATED ORAL 2 TIMES DAILY
Status: DISCONTINUED | OUTPATIENT
Start: 2018-01-01 | End: 2018-01-01

## 2018-01-01 RX ORDER — DONEPEZIL HYDROCHLORIDE 10 MG/1
10 TABLET, FILM COATED ORAL NIGHTLY
Status: DISCONTINUED | OUTPATIENT
Start: 2018-01-01 | End: 2018-01-01

## 2018-01-01 RX ORDER — DIVALPROEX SODIUM 250 MG/1
250 TABLET, EXTENDED RELEASE ORAL NIGHTLY
Qty: 30 TABLET | Refills: 0 | Status: SHIPPED | OUTPATIENT
Start: 2018-01-01

## 2018-01-01 RX ORDER — LORAZEPAM 2 MG/ML
0.5 INJECTION INTRAMUSCULAR ONCE
Status: COMPLETED | OUTPATIENT
Start: 2018-01-01 | End: 2018-01-01

## 2018-01-01 RX ORDER — ACETAMINOPHEN 650 MG/1
1000 SUPPOSITORY RECTAL EVERY 6 HOURS PRN
Status: DISCONTINUED | OUTPATIENT
Start: 2018-01-01 | End: 2018-01-01

## 2018-01-01 RX ORDER — QUETIAPINE 25 MG/1
25 TABLET, FILM COATED ORAL DAILY
Status: DISCONTINUED | OUTPATIENT
Start: 2018-01-01 | End: 2018-01-01

## 2018-01-01 RX ORDER — SODIUM CHLORIDE 9 MG/ML
INJECTION, SOLUTION INTRAVENOUS ONCE
Status: DISCONTINUED | OUTPATIENT
Start: 2018-01-01 | End: 2018-01-01

## 2018-01-01 RX ORDER — SODIUM CHLORIDE 0.9 % (FLUSH) 0.9 %
10 SYRINGE (ML) INJECTION AS NEEDED
Status: DISCONTINUED | OUTPATIENT
Start: 2018-01-01 | End: 2018-01-01

## 2018-01-01 RX ORDER — ACETAMINOPHEN 650 MG/1
1000 SUPPOSITORY RECTAL EVERY 6 HOURS PRN
Qty: 10 SUPPOSITORY | Refills: 0 | Status: SHIPPED | OUTPATIENT
Start: 2018-01-01 | End: 2018-01-01

## 2018-01-01 RX ORDER — LORAZEPAM 2 MG/ML
0.5 INJECTION INTRAMUSCULAR ONCE
Status: DISCONTINUED | OUTPATIENT
Start: 2018-01-01 | End: 2018-01-01

## 2018-01-01 RX ORDER — ACETAMINOPHEN 500 MG
500 TABLET ORAL EVERY 6 HOURS PRN
Status: DISCONTINUED | OUTPATIENT
Start: 2018-01-01 | End: 2018-01-01

## 2018-01-01 RX ORDER — MULTIVITAMIN
1 CAPSULE ORAL DAILY
Status: DISCONTINUED | OUTPATIENT
Start: 2018-01-01 | End: 2018-01-01

## 2018-01-01 RX ORDER — IPRATROPIUM BROMIDE AND ALBUTEROL SULFATE 2.5; .5 MG/3ML; MG/3ML
3 SOLUTION RESPIRATORY (INHALATION)
Status: DISCONTINUED | OUTPATIENT
Start: 2018-01-01 | End: 2018-01-01

## 2018-01-01 RX ORDER — OSELTAMIVIR PHOSPHATE 75 MG/1
75 CAPSULE ORAL 2 TIMES DAILY
Status: DISCONTINUED | OUTPATIENT
Start: 2018-01-01 | End: 2018-01-01

## 2018-01-01 RX ORDER — IPRATROPIUM BROMIDE AND ALBUTEROL SULFATE 2.5; .5 MG/3ML; MG/3ML
3 SOLUTION RESPIRATORY (INHALATION)
Qty: 30 VIAL | Refills: 0 | Status: SHIPPED | OUTPATIENT
Start: 2018-01-01

## 2018-01-01 RX ORDER — AMLODIPINE BESYLATE 5 MG/1
5 TABLET ORAL DAILY
Qty: 30 TABLET | Refills: 0 | Status: SHIPPED | OUTPATIENT
Start: 2018-01-01

## 2018-01-01 RX ORDER — HALOPERIDOL 5 MG/ML
1 INJECTION INTRAMUSCULAR EVERY 6 HOURS PRN
Status: DISCONTINUED | OUTPATIENT
Start: 2018-01-01 | End: 2018-01-01

## 2018-01-01 RX ORDER — QUETIAPINE 25 MG/1
50 TABLET, FILM COATED ORAL NIGHTLY
Status: DISCONTINUED | OUTPATIENT
Start: 2018-01-01 | End: 2018-01-01

## 2018-01-01 RX ORDER — AMLODIPINE BESYLATE 5 MG/1
5 TABLET ORAL DAILY
Status: DISCONTINUED | OUTPATIENT
Start: 2018-01-01 | End: 2018-01-01

## 2018-01-01 RX ORDER — LISINOPRIL AND HYDROCHLOROTHIAZIDE 12.5; 1 MG/1; MG/1
1 TABLET ORAL
Status: DISCONTINUED | OUTPATIENT
Start: 2018-01-01 | End: 2018-01-01

## 2018-01-01 RX ORDER — LORAZEPAM 2 MG/ML
INJECTION INTRAMUSCULAR
Status: DISCONTINUED
Start: 2018-01-01 | End: 2018-01-01

## 2018-01-01 RX ORDER — IPRATROPIUM BROMIDE AND ALBUTEROL SULFATE 2.5; .5 MG/3ML; MG/3ML
3 SOLUTION RESPIRATORY (INHALATION) EVERY 6 HOURS PRN
Status: DISCONTINUED | OUTPATIENT
Start: 2018-01-01 | End: 2018-01-01

## 2018-01-01 RX ORDER — POTASSIUM CHLORIDE 20 MEQ/1
40 TABLET, EXTENDED RELEASE ORAL EVERY 4 HOURS
Status: COMPLETED | OUTPATIENT
Start: 2018-01-01 | End: 2018-01-01

## 2018-01-01 RX ORDER — LORAZEPAM 2 MG/ML
2 INJECTION INTRAMUSCULAR ONCE
Status: COMPLETED | OUTPATIENT
Start: 2018-01-01 | End: 2018-01-01

## 2018-01-01 RX ORDER — PANTOPRAZOLE SODIUM 20 MG/1
20 TABLET, DELAYED RELEASE ORAL
Status: DISCONTINUED | OUTPATIENT
Start: 2018-01-01 | End: 2018-01-01

## 2018-01-01 RX ORDER — SODIUM CHLORIDE 0.9 % (FLUSH) 0.9 %
3 SYRINGE (ML) INJECTION AS NEEDED
Status: DISCONTINUED | OUTPATIENT
Start: 2018-01-01 | End: 2018-01-01

## 2018-01-01 RX ORDER — DEXTROSE MONOHYDRATE 25 G/50ML
50 INJECTION, SOLUTION INTRAVENOUS AS NEEDED
Status: DISCONTINUED | OUTPATIENT
Start: 2018-01-01 | End: 2018-01-01

## 2018-01-08 PROBLEM — S06.5XAA ACUTE SUBDURAL HEMATOMA (HCC): Status: ACTIVE | Noted: 2018-01-01

## 2018-01-08 PROBLEM — S06.5X9A ACUTE SUBDURAL HEMATOMA (HCC): Status: ACTIVE | Noted: 2018-01-01

## 2018-01-09 PROBLEM — S16.1XXA STRAIN OF NECK MUSCLE, INITIAL ENCOUNTER: Status: ACTIVE | Noted: 2018-01-01

## 2018-01-09 NOTE — ED PROVIDER NOTES
Patient Seen in: [facility] AND [facility] Emergency Department    History   Patient presents with:  Fall (musculoskeletal, neurologic)    Stated Complaint: fall    HPI    Patient presents after fall.   It was a witnessed fall at home mechanical fall he fell and Take 1 tablet by mouth daily. Cyanocobalamin (B-12) 2000 MCG Oral Tab,  Take 1 tablet by mouth daily. aspirin 325 MG Oral Tab,  Take 325 mg by mouth daily.    acetaminophen (TYLENOL EXTRA STRENGTH) 500 MG Oral Tab,  Take 500 mg by mouth every 6 (six) ho lesions. Cardiovascular:  Normal S1 and S2, no murmur, regular, with good peripheral perfusion. Respiratory:  Lungs clear to auscultation bilaterally with good effort. No wheezes, ronchi, or rales. Abdomen:  Soft, non-tender, non-distended.   No masses, GFR, -American 46 (*)     All other components within normal limits   URINALYSIS WITH CULTURE REFLEX - Abnormal; Notable for the following:     Clarity Urine Hazy (*)     Protein Urine 30  (*)     Blood Urine Small (*)     Leukocyte Esterase Urine T All other components within normal limits   PROTHROMBIN TIME (PT) - Normal   PTT, ACTIVATED - Normal   LACTIC ACID, PLASMA - Normal   PROTHROMBIN TIME (PT) - Normal   ED/MRSA SCREEN BY PCR-CC - Normal   CBC WITH DIFFERENTIAL WITH PLATELET    Narrative: depression noted    Disposition and Plan     We recommend that you schedule follow up care with a primary care provider within the next three months to obtain basic health screening including reassessment of your blood pressure.       Clinical Impression:

## 2018-01-09 NOTE — TELEPHONE ENCOUNTER
Message noted; can discuss with hospitalist doctors about follow up arrangements and what can be done when he is discharged.

## 2018-01-09 NOTE — CM/SW NOTE
Spoke with patients family about concerns about patient. Pt needs extra help around the house with ADL's. Patients wife verbalized that patient is getting deconditioned and requested that patient have more physical therapy to regain more strength.  Pt also

## 2018-01-09 NOTE — ED INITIAL ASSESSMENT (HPI)
Marcial Freeman arrives via EMS for eval after tripping and falling down 4 stairs. No LOC or head injury per family on scene. Pt c/o pain all over and neck pain to EMS. c-collar applied per EMS.

## 2018-01-09 NOTE — ED NOTES
Dr Ignacio Lopez called back. Per Dr Salvador Hoffman, Dr Chula Campbell is aware of the consult and will see the patient this afternoon.

## 2018-01-09 NOTE — PROGRESS NOTES
Auto substitution: Microzide to Zestoretic 10-12.5  Per p&t protocol.   Ashton Severe, [facility]

## 2018-01-09 NOTE — PROGRESS NOTES
Critical CARE progress note. Subjective: The patient was positive for influenza and Tamiflu was initiated. A repeat CT scan the brain is still pending.     Objective: Vital signs normal. HEENT examination is unremarkable with pupils equal round and reac

## 2018-01-09 NOTE — TELEPHONE ENCOUNTER
Pt daughter calling to inform Dr Yuliana Fuentes pt fell yesterday and was admitted to [facility].    Daughter states she is not sure how to handle a follow up visit since it is very difficult to get pt to go to see a doctor.   Will inform MD.

## 2018-01-09 NOTE — CONSULTS
[facility]    Neurosurgery Consultation    [address] Patient Status:  Emergency    5/10/1930 MRN [id number]   Location [address] Attending Purnima Garland MD   Hosp Day # 0 PCP Tammy Castleman, 10 mg, Oral, Nightly  •  [START ON 1/10/2018] Pantoprazole Sodium (PROTONIX) EC tab 20 mg, 20 mg, Oral, QAM AC  •  QUEtiapine Fumarate (SEROQUEL) tab 25 mg, 25 mg, Oral, BID  •  atorvastatin (LIPITOR) tab 20 mg, 20 mg, Oral, Nightly  •  Normal Saline Flush Intracranial atherosclerosis cavernous carotid arteries. 5. Image degradation by patient motion artifact. Ct Brain Or Head (22460)    Result Date: 1/8/2018  CONCLUSION:  1. Small acute right frontoparietal parafalcine subdural hematoma measuring 4.

## 2018-01-09 NOTE — H&P
[facility]    History & Physical    Date:  1/8/2018   Date of Admission:  1/8/2018      Chief Complaint:   Hyacinth Ross is a(n) 80year old male with intracranial hemorrhage status post fall.     HPI:   The patient is a history of pro unremarkable. No weight loss no weight gain. Physical Exam:   Vital Signs:  Blood pressure 151/61, pulse 84, temperature 98.1 °F (36.7 °C), resp. rate 18, SpO2 94 %.     Alert white male without distress  HEENT examination is unremarkable with pupils equ

## 2018-01-10 NOTE — PROGRESS NOTES
[facility]    Progress Note      Assessment and Plan:   1. Acute subdural hematoma–status post fall. Clinically, looks good. The patient was on aspirin. He received platelets.   The CT scan the brain is stable and the patient was evaluat

## 2018-01-10 NOTE — OCCUPATIONAL THERAPY NOTE
OCCUPATIONAL THERAPY EVALUATION - INPATIENT     Room Number: 561/794-Z  Evaluation Date: 1/10/2018  Type of Evaluation: Initial  Presenting Problem:  (SDH)    Physician Order: IP Consult to Occupational Therapy  Reason for Therapy: ADL/IADL Dysfunction and and in place. DISCHARGE RECOMMENDATIONS  OT Discharge Recommendations: 24 hour care/supervision;Cont skilled therapy in a supervised setting; Long term care       PLAN  OT Treatment Plan: Balance activities; Energy conservation/work simplification techniq strength is within functional limits     COORDINATION  Gross Motor: WFL   Fine Motor: WFL     ACTIVITIES OF DAILY LIVING ASSESSMENT  AM-PAC ‘6-Clicks’ Inpatient Daily Activity Short Form  How much help from another person does the patient currently need…

## 2018-01-10 NOTE — CONSULTS
[facility] - [facility]    Report of Consultation    Russell Duffy Patient Status:  Inpatient    5/10/1930 MRN [id number]   Location [facility] 3W/SW Attending Skeeter Jeans, MD   Hosp Day # 1 PCP Rachelle Salgado MD     Date of Admiss Comment: EGD with Biopsy  No date: PROSTATE BIOPSIES    Family History  Family History   Problem Relation Age of Onset   • Prostate Cancer Brother    • Prostate Cancer Maternal Uncle        Social History  Patient Guardian Status:  Not on file.     Other To TABLET BY MOUTH EVERY DAY   QUEtiapine Fumarate 25 MG Oral Tab Take 1 tab in the morning and 2 tablets nightly   Cholecalciferol (VITAMIN D) 2000 units Oral Cap Take 1 capsule by mouth daily.    omeprazole 20 MG Oral Capsule Delayed Release Take 1 capsule ( visual fields. Pupil react to light. Fundoscopic exam normal.  III,IV,VI- EOM full, with normal pursuit. V-Facial sensation intact, with symmetric corneal reflex. VII- face symmetric. VIII- Auditory acuity symmetric. Ear canals were clear.   No goodwin sig parafalcine meningioma left frontal region redemonstrated. This has remained stable. Ct Spine Cervical (cpt=72125)    Result Date: 1/8/2018  CONCLUSION:  1. S. shaped scoliosis and multilevel cervical spondylosis.  Reversal of the normal cervical lo

## 2018-01-10 NOTE — PHYSICAL THERAPY NOTE
PHYSICAL THERAPY EVALUATION - INPATIENT     Room Number: 497/058-J  Evaluation Date: 1/10/2018  Type of Evaluation: Initial  Physician Order: PT Eval and Treat    Presenting Problem: acute subdural hematoma  Reason for Therapy: Mobility Dysfunction and Patient may need long term care after rehab facility. Patient will benefit from continued IP PT services to address these deficits in preparation for discharge.     DISCHARGE RECOMMENDATIONS  PT Discharge Recommendations: 24 hour care/supervision;Lon +  Dynamic Standing: Poor +    ACTIVITY TOLERANCE  O2 Saturation: 96%  O2 Device: Nasal cannula  Liters of O2:  3    AM-PAC '6-Clicks' INPATIENT SHORT FORM - BASIC MOBILITY  How much difficulty does the patient currently have. ..  -   Turning over in bed (i activity/exercise instructions provided to patient in preparation for discharge.    Goal #5   Current Status    Goal #6    Goal #6  Current Status

## 2018-01-10 NOTE — PROGRESS NOTES
[facility]    Progress Note    [address] Patient Status:  Inpatient    5/10/1930 MRN [id number]   Location [facility] 3W/SW Attending Armando Cantu MD   Hosp Day # 1 PCP Kassandra Hector MD       Subjective:   Parisa Silverman lisinopril-hydrochlorothiazide (ZESTORETIC 10/12. 5) combination tablet   Oral Daily   • Oseltamivir Phosphate  75 mg Oral BID   • Insulin Aspart Pen  1-5 Units Subcutaneous TID CC       Current PRN Inpatient Meds:      haloperidol lactate, acetaminophen, N frontal parafalcine meningioma-unchanged. 3. Changes of chronic small vessel disease in cerebral white matter. 4. Intracranial atherosclerosis cavernous carotid arteries. 5. Image degradation by patient motion artifact.          Ct Brain Or Head (18409) able  -resume home dose of Seroquel  -will ask Neuro to assist  -Haldol PRN  -cont aricept    Influenza  -tamiflu    HL  -statin    HTN  -keep SBP <160    Lung lesion  -CT as outpatient    Acute renal failure  -IVF  -monitor    VTE P: SCDs only           G

## 2018-01-10 NOTE — CM/SW NOTE
1/10-MD orders received in regards to discharge planning-rehab. This Writer spoke with the Patient's wife Jimmy Crow ([phone]) in regards to discharge planning.   The Patient resides with her wife and sister in-law  In [place]  in a single family home

## 2018-01-10 NOTE — TELEPHONE ENCOUNTER
Patient is eligible for a 2018 Annual Medicare Physical Exam.   Wife states that they will call back to schedule.

## 2018-01-11 NOTE — PROGRESS NOTES
[facility]    Progress Note    212 Main Patient Status:  Inpatient    5/10/1930 MRN [id number]   Location [facility] 3W/SW Attending Sheridan Rivera,*   Hosp Day # 2 PCP Renetta Vance MD       Subjective:   Cayla Sutton Intravenous PRN   0.9%  NaCl infusion  Intravenous Once   acetaminophen (TYLENOL) 650 MG rectal suppository 975 mg 975 mg Rectal Q6H PRN   lisinopril (PRINIVIL,ZESTRIL) 10 mg, hydrochlorothiazide (MICROZIDE) 12.5 mg for Zestoretic 10-12.5 (EEH only)  Oral characterization.                  Sulema Joshua MD, MD  1/11/2018

## 2018-01-11 NOTE — PROGRESS NOTES
[facility]    Progress Note      Assessment and Plan:   1. Acute subdural hematoma– improving clinically although some encephalopathy.     Recommendations:  1. As per Neurosurgical consultation and neurology.   2.  PT/OT     2.    Yoselin Mclaughlin

## 2018-01-11 NOTE — PHYSICAL THERAPY NOTE
PHYSICAL THERAPY TREATMENT NOTE - INPATIENT    Room Number: 572/539-X       Presenting Problem: acute subdural hematoma    Problem List  Principal Problem:    Acute subdural hematoma (HCC)  Active Problems:    Strain of neck muscle, initial encounter 0          BALANCE                                                                                                                     Static Sitting: Fair +  Dynamic Sitting: Fair           Static Standing: Not tested  Dynamic Standing: Not tested    ACTI Current Status Not tested    Goal #4     Goal #4   Current Status     Goal #5 Patient to demonstrate independence with home activity/exercise instructions provided to patient in preparation for discharge.    Goal #5   Current Status In progress   Goal #6

## 2018-01-11 NOTE — PROGRESS NOTES
[place] [facility] - [facility]    Progress Note    212 Main Patient Status:  Inpatient    5/10/1930 MRN [id number]   Location [facility] 3W/SW Attending Toney Rodarte Day # 2 PCP Joseph Canavan, MD     Subjective:     Romero Quiros 01/08/2018   ALB 3.4 (L) 12/02/2016   ALKPHO 77 12/02/2016   BILT 0.6 12/02/2016   TP 6.6 12/02/2016   AST 23 12/02/2016   ALT 28 12/02/2016   PTT 28.7 01/08/2018   INR 1.1 01/10/2018   TSH 4.11 12/17/2013   PSA 0.3 12/06/2016       Ct Brain Or Head ([id number]

## 2018-01-12 NOTE — OCCUPATIONAL THERAPY NOTE
OCCUPATIONAL THERAPY TREATMENT NOTE - INPATIENT     Room Number: 930/957-U     Presenting Problem:  (SDH)    Problem List  Principal Problem:    Acute subdural hematoma (HCC)  Active Problems:    Strain of neck muscle, initial encounter      ASSESSMENT   N TOLERANCE  O2 Saturation: 97%  Room air  Liters of O2:  2  No shortness of breath    ACTIVITIES OF DAILY LIVING ASSESSMENT  AM-PAC ‘6-Clicks’ Inpatient Daily Activity Short Form  How much help from another person does the patient currently need…  -   Rodolfo

## 2018-01-12 NOTE — PROGRESS NOTES
[place] [facility] - [facility]    Progress Note    Rebekah Kira Duffy Patient Status:  Inpatient    5/10/1930 MRN [id number]   Location [facility] 3W/SW Attending Toney Rodarte Day # 3 PCP Cheryl Quinonez MD     Subjective:     C lesion  -CT as outpatient     Acute renal failure on ckd-3  -IVF  -monitor     VTE P: SCDs only        46 min spent on pt of which 25 min spent coordinating care with nurse and trying to  pt as well as his wife with his permission eloise dee 1-2 days t

## 2018-01-13 NOTE — CM/SW NOTE
REJI received for snf placement. SW placed call to the wife Giselle Galan to discuss snf options. Preference indicated for [facility] snf as she is familiar with that location, and being a preferred provider. Referral sent via [employer], awaiting review.  DON screen

## 2018-01-13 NOTE — PROGRESS NOTES
[place] [facility] - [facility]    Progress Note    212 Main Patient Status:  Inpatient    5/10/1930 MRN [id number]   Location [facility] 3W/SW Attending Toney Rodarte Day # 4 PCP Oniel Navarro MD     Subjective:     Walt Prost 6.9 01/13/2018   HGB 10.8 (L) 01/13/2018   HCT 33.0 (L) 01/13/2018    01/13/2018   CREATSERUM 1.22 01/13/2018   BUN 17 01/13/2018    01/13/2018   K 4.0 01/13/2018    01/13/2018   CO2 22 01/13/2018    (H) 01/13/2018   CA 9.0 01/13/

## 2018-01-13 NOTE — PROGRESS NOTES
Patient insistent on not being touched for turning or to be changed from incontinent episode. Visible urine in brief. Patient began to change vocal tone to more threatening when educated about the importance of being clean and dry. Still refused.  Oncoming

## 2018-01-14 NOTE — CONSULTS
[facility]    Report of Consultation    Jeffy Duffy Patient Status:  Inpatient    5/10/1930 MRN [id number]   Location [facility] 3W/SW Attending Toney Rodarte Nadege Day # 4 PCP Rena Samuel MD     Date of presented alert and oriented to self and place but not to date or the condition. He was able to recognize that he has been visited by his daughter who is in bedside. Patient reported his age is 68 and he lives with his wife.     Patient presented calm and QUEtiapine Fumarate (SEROQUEL) tab 25 mg 25 mg Oral Daily   haloperidol lactate (HALDOL) 5 MG/ML injection 1 mg 1 mg Intramuscular Q6H PRN   hydrALAzine HCl (APRESOLINE) injection 5 mg 5 mg Intravenous Q4H PRN   multivitamin stress formula (ADULT) 1 tabl 10 mg by mouth every morning.  )   Glucosamine-Chondroitin (GLUCOSAMINE CHONDR COMPLEX OR) Take by mouth. Bisacodyl (DULCOLAX OR) Take by mouth as needed. Multiple Vitamin (MULTIVITAMINS OR) Take 1 tablet by mouth daily.    Cyanocobalamin (B-12) 2000 [facility] dementia with disturbed behavior  Episodic mood disorder      Discussed risk and benefit, acknowledging the current symptom and severity.   Patient with history of dementia with deterioration in his cognition and occasional mood disturbance who presented af

## 2018-01-14 NOTE — PROGRESS NOTES
[place] [facility] - [facility]    Progress Note    Any Bass Clive Patient Status:  Inpatient    5/10/1930 MRN [id number]   Location [facility] 3W/SW Attending Toney Rodarte Day # 5 PCP Rizwan Rangel MD     Subjective:     C only         47 min spent on pt of which 32 min spent trying to  pt as well as his wife with his permission poss dc tomorrow  to snf when approval comes dupage screen and psych eval appreciated         Results:     Lab Results  Component Value Date

## 2018-01-14 NOTE — CM/SW NOTE
CIARAN informed by MD that pt may be stable for dc to snf on Monday.  CIARAN sent updated clinicals including psych note to [facility] and requested clarification on bed availability as pt is isolation for flu.    FERMIN oshea [id number]

## 2018-01-15 NOTE — PROGRESS NOTES
[facility]    Progress Note    212 Main Patient Status:  Inpatient    5/10/1930 MRN [id number]   Location [facility] 3W/SW Attending Toney Rodarte Day # 6 PCP Cheryl Quinonez MD     Subjective:     Yonis Phillips 01/13/2018    01/13/2018   CO2 22 01/13/2018    (H) 01/13/2018   CA 9.0 01/13/2018   ALB 3.4 (L) 12/02/2016   ALKPHO 77 12/02/2016   BILT 0.6 12/02/2016   TP 6.6 12/02/2016   AST 23 12/02/2016   ALT 28 12/02/2016   PTT 28.7 01/08/2018   INR 1.

## 2018-01-15 NOTE — CM/SW NOTE
1/15/18 CM Discharge planning   Call rec'd from Glenn at [facility], pt is out of network. Spoke with wife Stacey Fletcher via phone [phone], advised of above, wife agreed to rehab at [facility]. Referral made to [facility] via [employer].   Transf

## 2018-01-15 NOTE — PHYSICAL THERAPY NOTE
PHYSICAL THERAPY TREATMENT NOTE - INPATIENT    Room Number: 218/242-U       Presenting Problem: acute subdural hematoma    Problem List  Principal Problem:    Acute subdural hematoma (HCC)  Active Problems:    Strain of neck muscle, initial encounter    D and from a bed to a chair (including a wheelchair)?: Total   -   Need to walk in hospital room?: Total   -   Climbing 3-5 steps with a railing?: Total    AM-PAC Score:  Raw Score: 9   PT Approx Degree of Impairment Score: 81.38%   Standardized Score (AM-PA

## 2018-01-15 NOTE — DISCHARGE SUMMARY
More than 30 min spent on dc  Dc summary #   Hospital Discharge Diagnoses: ich    TCM Diagnosis at discharge from Hospital: see above    Please note that only patients enrolled in the Medicare ACO, [facility] ACO and [address] [place] [address] will be handled by a member of the

## 2018-01-16 NOTE — OCCUPATIONAL THERAPY NOTE
OCCUPATIONAL THERAPY TREATMENT NOTE - INPATIENT     Room Number: 132/767-S   Presenting Problem:  (SDH)    Problem List  Principal Problem:    Acute subdural hematoma (HCC)  Active Problems:    Strain of neck muscle, initial encounter    Delirium superimpo care/supervision;Cont skilled therapy in a supervised setting; Long term care         PLAN  OT Treatment Plan: Balance activities; Energy conservation/work simplification techniques;ADL training;Functional transfer training; Endurance training;Patient/Family Patient will complete functional transfer with mod a   Comment: dep    Patient will complete toileting with mod a   Comment: dep    Patient will complete OFH in sitting with setup  Comment: min a while in supine            Goals  on:   Frequency:

## 2018-01-16 NOTE — PLAN OF CARE
Pt to be discharged to [facility] [address] via [facility]. Report give to ARLETTE Call. Wife notified of transfer by Kari Heaton. 1720 - Pt became very agitated with transfer. Ativan 0.5 mg IM received to L deltoid x 1 dose.  Updated Jakub,

## 2018-01-16 NOTE — PROGRESS NOTES
The patient seen today for 25 minute, follow-up evaluation, over 50% counseling and managing treatment plan.     Patient is a 80year old   male with history of diabetes, hypertension and official diagnosis of dementia who had recent [place]

## 2018-01-16 NOTE — PLAN OF CARE
Pt confused, A&O x 2. Pt refusing staff to check blood sugar this AM. Reinforcement was made in regards to patient's diabetes and risk for low blood sugar. Pt has poor appetite. Pt con't to refuse blood glucose checks.  Staff will re attempt and con't reinf

## 2018-01-16 NOTE — PLAN OF CARE
Patient/Family Goals    • Patient/Family Long Term Goal Not Progressing          CARDIOVASCULAR - ADULT    • Maintains optimal cardiac output and hemodynamic stability Progressing    • Absence of cardiac arrhythmias or at baseline Progressing        Diabet

## 2018-01-16 NOTE — DISCHARGE SUMMARY
More than 30 min spent on dc  Dc summary # [id number]  Hospital Discharge Diagnoses: subdural, dementia    Lace+ Score: 75  59-90 High Risk  29-58 Medium Risk  0-28   Low Risk.     TCM Follow-Up Recommendation:  High risk tcm fu recommended

## 2018-01-16 NOTE — CM/SW NOTE
1/16/18 Cm Discharge planning   Spoke with Luis Contreras at [facility] has rec'd Ins authorization, although no beds available at [facility] today,  Can accept pt at [facility].     Spoke with wife Vianca via phone [phone] advised of abo

## 2018-01-17 NOTE — DISCHARGE SUMMARY
[facility]    PATIENT'S NAME: Tavon Westbrook   ATTENDING PHYSICIAN: Myke Rodarte MD   PATIENT ACCOUNT#:   [id number]    LOCATION:  [address] [place] [address] #:   [id number]       YOB: 1930  ADMISSION DATE:       01 A mechanical fall, acute subdural hematoma. Patient doing well. Hold aspirin at least 2 weeks. 2.   Acute encephalopathy with dementia. Patient has chronic aggressive behavior. 3.   Urinary retention.   For a while, wife refused to allow us to remove

## 2018-01-31 NOTE — TELEPHONE ENCOUNTER
Patient is eligible for a 2018 Annual Medicare Health Assessment. Discussed w/wife. Patient is currently in hospice.

## 2018-02-20 ENCOUNTER — TELEPHONE (OUTPATIENT)
Dept: FAMILY MEDICINE CLINIC | Facility: CLINIC | Age: 83
End: 2018-02-20

## (undated) NOTE — IP AVS SNAPSHOT
Patient Demographics     Address  [place] [address] Phone  [phone] (Home) *Preferred* E-mail Address  [email or web address]      Emergency Contact(s)     Name Relation Home Work Mobile    Elise Duffy Spouse [phone] Commonly known as:  SEROQUEL  Take 1 tab in the morning and 2 tablets nightly     simvastatin 40 MG Tabs  Commonly known as:  ZOCOR  TAKE 1 TABLET BY MOUTH EVERY EVENING     Vitamin D 2000 units Caps        STOP taking these medications    aspirin 325 MG T Next dose due: Today 1/16/18 as needed      Take 500 mg by mouth every 6 (six) hours as needed for Pain. AmLODIPine Besylate 5 MG Tabs  Commonly known as:  NORVASC  Next dose due:  1/17/18 morning      Take 1 tablet (5 mg total) by mouth daily. Take 1 tablet by mouth daily. omeprazole 20 MG Cpdr  Commonly known as:  PRILOSEC  Next dose due:  1/17/18 morning      Take 1 capsule (20 mg total) by mouth every morning.    Mirza Asif MD         QUEtiapine Fumarate 25 MG Tabs  C [id number] Insulin Aspart Pen (NOVOLOG) 100 UNIT/ML flexpen 1-5 Units 01/15/18 1810 Given      [id number] Insulin Aspart Pen (NOVOLOG) 100 UNIT/ML flexpen 1-5 Units 01/16/18 1244 Given  bs 177          RIGHT UPPER ARM     Order ID Medication Name Action [facility] Coronavirus Oc43 PCR: Negative     Metapneumovirus PCR: Negative     Rhinovirus/Entero PCR: Negative     Influenza A H3 PCR: Positive (A)     Influenza B PCR: Negative     Parainfluenza 1 PCR: Negative     Parainlfuenza 2 PCR Negative     Parainlfuenza 3 infection with a little cough. He denies shortness of breath, chest pain, hemoptysis, headache, mental status changes. There is been no loss of sensation in his arms or legs.   Came to emergency room and CT scan the brain suggested intracranial hemorrhage Neurologic grossly intact with symmetric tone and strength and reflex. Results:    CT scan of the spine– scoliosis, cervical spondylosis, multifactorial central spinal stenosis C5-6 and C6-7. CT scan the brain–1.  Small acute right frontoparietal para 1/8/2018 11:02 PM       [facility]    History & Physical    Date:  1/8/2018   Date of Admission:  1/8/2018      Chief Complaint:   Lenora Hoover is a(n) 80year old male with intracranial hemorrhage status post fall.     HPI:   The pat normal. No depression. No thyroid disease. No rash. Muscles and joints unremarkable. No weight loss no weight gain. Physical Exam:   Vital Signs:  Blood pressure 151/61, pulse 84, temperature 98.1 °F (36.7 °C), resp. rate 18, SpO2 94 %.     Alert white m Electronically signed by Jett Bo MD on 1/8/2018 10:58 PM   Attribution Colon    PC.1 - Jett Bo MD on 1/8/2018 10:51 PM                        Consults - MD Consult Notes      Consults signed by Lola Whitt MD at 1/13/2018  6:29 PM Apparently the patient had a diagnosis of dementia, Alzheimer versus frontal lobe dementia. Patient has been exhibiting behavioral disturbance and periodic agitation prior to head trauma.   Patient presented with subacute hematoma after fall and patient ha • Prostate Cancer Maternal Uncle        Social History  Smoking status: Former Smoker                                                              Packs/day: 1.00      Years: 15.00        Types: Cigarettes     Quit date: 2/2/1985  Smokeless tobacco: Never QUEtiapine Fumarate 25 MG Oral Tab Take 1 tab in the morning and 2 tablets nightly   Cholecalciferol (VITAMIN D) 2000 units Oral Cap Take 1 capsule by mouth daily.    omeprazole 20 MG Oral Capsule Delayed Release Take 1 capsule (20 mg total) by mouth every temperature source Oral, resp. rate 21, weight 287 lb, SpO2 99 %. Mental Status Exam:     The patient is alert and oriented to self and to place that he is in the hospital.  Patient otherwise was disoriented to the condition and to date.   Patient is an Prothrombin Time (PT)      CBC With Differential With Platelet      Basic Metabolic Panel (8)      Magnesium      Basic Metabolic Panel (8)      CBC With Differential With Platelet      Basic Metabolic Panel (8)      Magnesium      Prepare platelets On :  Belia Carnes PTA (Physical Therapist)        PHYSICAL THERAPY TREATMENT NOTE - INPATIENT    Room Number: 304/304-A[DB. 1]       Presenting Problem: acute subdural hematoma[DB. 2]    Problem List[DB. 1]  Principal Problem:    Acute subdural hemato -   Moving from lying on back to sitting on the side of the bed?: A Lot[DB. 2]   How much help from another person does the patient currently need. ..[DB.1]   -   Moving to and from a bed to a chair (including a wheelchair)?: Total   -   Need to walk in hosp No notes of this type exist for this encounter. Video Swallow Study Notes     No notes of this type exist for this encounter. SLP Notes     No notes of this type exist for this encounter.             Immunizations     Name Date      INFLUENZA 11/

## (undated) NOTE — MR AVS SNAPSHOT
[address]  [phone]               Thank you for choosing us for your health care visit with Inez Vanegas DPM.  We are glad to serve you and happy to provide yo acetaminophen 500 MG Tabs   Take 500 mg by mouth every 6 (six) hours as needed for Pain. Commonly known as:  TYLENOL EXTRA STRENGTH           aspirin 325 MG Tabs   Take 325 mg by mouth daily. B-12 2000 MCG Tabs   Take 1 tablet by mouth daily.

## (undated) NOTE — MR AVS SNAPSHOT
[place]  [address]  [phone]               Thank you for choosing us for your health care visit with Phil Mcintyre.   We are glad to serve you and happy to provide you with this melendez Take 1 tablet (10 mg total) by mouth nightly. What changed:  when to take this   Commonly known as:  ARICEPT           DULCOLAX OR   Take by mouth as needed.            glimepiride 1 MG Tabs   TAKE 1 TABLET BY MOUTH EVERY MORNING WITH BREAKFAST   Commonly

## (undated) NOTE — MR AVS SNAPSHOT
[facility] SPECIALTY [facility] [place] [address] [address]  [address] [phone]               Thank you for choosing us for your health care visit with Regis Hassan MD.  We are glad to serve you and happy to provide you with this summary of y aspirin 325 MG Tabs   Take 325 mg by mouth daily. B-12 2000 MCG Tabs   Take 1 tablet by mouth daily. Donepezil HCl 10 MG Tabs   Take 1 tablet (10 mg total) by mouth nightly.    What changed:  when to take this   Commonly known as

## (undated) NOTE — MR AVS SNAPSHOT
[place]  [address]  [phone]               Thank you for choosing us for your health care visit with Shamar Howell MD.  We are glad to serve you and happy to provide you with this summary Take 500 mg by mouth every 6 (six) hours as needed for Pain. Commonly known as:  TYLENOL EXTRA STRENGTH           aspirin 325 MG Tabs   Take 325 mg by mouth daily. B-12 2000 MCG Tabs   Take 1 tablet by mouth daily.            Donepezil HCl 10 MG Eat plenty of low-fat dairy products High fat meats and dairy   Choose whole grain products Foods high in sodium   Water is best for hydration Fast food.    Eat at home when possible     Tips for increasing your physical activity – Adults who are physically

## (undated) NOTE — MR AVS SNAPSHOT
[address]  [address]  [phone]               Thank you for choosing us for your health care visit with Ramandeep Pettit DPM.  We are glad to serve you and happy to provide yo Instructions and Information about Your Health     None      Allergies as of Apr 28, 2017     No Known Allergies                   Current Medications          This list is accurate as of: 4/28/17 11:30 AM.  Always use your most recent med list.